# Patient Record
Sex: FEMALE | Race: WHITE | NOT HISPANIC OR LATINO | Employment: OTHER | URBAN - METROPOLITAN AREA
[De-identification: names, ages, dates, MRNs, and addresses within clinical notes are randomized per-mention and may not be internally consistent; named-entity substitution may affect disease eponyms.]

---

## 2017-03-15 ENCOUNTER — OFFICE VISIT (OUTPATIENT)
Dept: OBSTETRICS AND GYNECOLOGY | Facility: CLINIC | Age: 57
End: 2017-03-15

## 2017-03-15 VITALS
DIASTOLIC BLOOD PRESSURE: 80 MMHG | BODY MASS INDEX: 33.13 KG/M2 | WEIGHT: 180 LBS | HEIGHT: 62 IN | SYSTOLIC BLOOD PRESSURE: 124 MMHG

## 2017-03-15 DIAGNOSIS — Z12.4 PAP SMEAR FOR CERVICAL CANCER SCREENING: ICD-10-CM

## 2017-03-15 DIAGNOSIS — F17.200 CURRENT SMOKER: ICD-10-CM

## 2017-03-15 DIAGNOSIS — Z13.9 SCREENING: Primary | ICD-10-CM

## 2017-03-15 DIAGNOSIS — F17.200 SMOKER UNMOTIVATED TO QUIT: ICD-10-CM

## 2017-03-15 DIAGNOSIS — Z12.31 SCREENING MAMMOGRAM, ENCOUNTER FOR: ICD-10-CM

## 2017-03-15 DIAGNOSIS — E66.9 OBESITY (BMI 30-39.9): ICD-10-CM

## 2017-03-15 DIAGNOSIS — F11.20 METHADONE DEPENDENCE (HCC): ICD-10-CM

## 2017-03-15 DIAGNOSIS — Z01.419 WELL FEMALE EXAM WITH ROUTINE GYNECOLOGICAL EXAM: ICD-10-CM

## 2017-03-15 LAB
BILIRUB BLD-MCNC: ABNORMAL MG/DL
CLARITY, POC: CLEAR
COLOR UR: YELLOW
GLUCOSE UR STRIP-MCNC: NEGATIVE MG/DL
KETONES UR QL: ABNORMAL
LEUKOCYTE EST, POC: NEGATIVE
NITRITE UR-MCNC: NEGATIVE MG/ML
PH UR: 6 [PH] (ref 5–8)
PROT UR STRIP-MCNC: ABNORMAL MG/DL
RBC # UR STRIP: NEGATIVE /UL
SP GR UR: 1.02 (ref 1–1.03)
UROBILINOGEN UR QL: NORMAL

## 2017-03-15 PROCEDURE — 81002 URINALYSIS NONAUTO W/O SCOPE: CPT | Performed by: OBSTETRICS & GYNECOLOGY

## 2017-03-15 PROCEDURE — G0101 CA SCREEN;PELVIC/BREAST EXAM: HCPCS | Performed by: OBSTETRICS & GYNECOLOGY

## 2017-03-15 RX ORDER — OXYCODONE HYDROCHLORIDE AND ACETAMINOPHEN 5; 325 MG/1; MG/1
TABLET ORAL
Refills: 0 | COMMUNITY
Start: 2017-03-07 | End: 2022-04-18

## 2017-03-15 RX ORDER — ESTRADIOL 1 MG/1
1 TABLET ORAL DAILY
Qty: 30 TABLET | Refills: 12 | Status: SHIPPED | OUTPATIENT
Start: 2017-03-15 | End: 2017-06-28 | Stop reason: SDUPTHER

## 2017-03-15 RX ORDER — VARENICLINE TARTRATE 1 MG/1
TABLET, FILM COATED ORAL
Refills: 2 | COMMUNITY
Start: 2017-01-11 | End: 2022-04-18

## 2017-03-15 RX ORDER — METHADONE HYDROCHLORIDE 10 MG/1
10 TABLET ORAL 3 TIMES DAILY
Refills: 0 | COMMUNITY
Start: 2017-03-07

## 2017-03-15 RX ORDER — DICLOFENAC SODIUM 75 MG/1
75 TABLET, DELAYED RELEASE ORAL
Refills: 2 | COMMUNITY
Start: 2017-02-10 | End: 2022-04-18

## 2017-03-15 RX ORDER — CYCLOBENZAPRINE HCL 10 MG
10 TABLET ORAL 3 TIMES DAILY PRN
COMMUNITY
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
## 2017-03-17 LAB
CYTOLOGIST CVX/VAG CYTO: NORMAL
CYTOLOGY CVX/VAG DOC THIN PREP: NORMAL
DX ICD CODE: NORMAL
HIV 1 & 2 AB SER-IMP: NORMAL
HPV I/H RISK 4 DNA CVX QL PROBE+SIG AMP: NEGATIVE
OTHER STN SPEC: NORMAL
PATH REPORT.FINAL DX SPEC: NORMAL
STAT OF ADQ CVX/VAG CYTO-IMP: NORMAL

## 2017-03-20 ENCOUNTER — TELEPHONE (OUTPATIENT)
Dept: OBSTETRICS AND GYNECOLOGY | Facility: CLINIC | Age: 57
End: 2017-03-20

## 2017-03-20 NOTE — TELEPHONE ENCOUNTER
----- Message from Harry Otero MD sent at 3/17/2017  5:34 PM EDT -----  Call pt:  PAP is negative.

## 2017-06-28 RX ORDER — ESTRADIOL 1 MG/1
1 TABLET ORAL DAILY
Qty: 30 TABLET | Refills: 9 | Status: SHIPPED | OUTPATIENT
Start: 2017-06-28 | End: 2022-04-18

## 2017-08-31 DIAGNOSIS — Z12.31 SCREENING MAMMOGRAM, ENCOUNTER FOR: ICD-10-CM

## 2017-09-05 ENCOUNTER — TELEPHONE (OUTPATIENT)
Dept: OBSTETRICS AND GYNECOLOGY | Facility: CLINIC | Age: 57
End: 2017-09-05

## 2017-09-05 NOTE — TELEPHONE ENCOUNTER
----- Message from Harry Otero MD sent at 9/1/2017  7:57 AM EDT -----      ----- Message -----     From: Marie Robison Rep     Sent: 8/31/2017   9:13 AM       To: Harry Otero MD

## 2017-09-06 ENCOUNTER — TELEPHONE (OUTPATIENT)
Dept: OBSTETRICS AND GYNECOLOGY | Facility: CLINIC | Age: 57
End: 2017-09-06

## 2017-09-06 NOTE — TELEPHONE ENCOUNTER
----- Message from Harry Otero MD sent at 9/1/2017  7:57 AM EDT -----      ----- Message -----     From: Marie Robison     Sent: 8/31/2017   9:13 AM       To: Harry Otero MD      Call patient: Mammogram is within normal limits

## 2018-11-30 RX ORDER — ESTRADIOL 1 MG/1
1 TABLET ORAL DAILY
Qty: 30 TABLET | Refills: 1 | OUTPATIENT
Start: 2018-11-30

## 2022-04-18 ENCOUNTER — HOSPITAL ENCOUNTER (OUTPATIENT)
Dept: GENERAL RADIOLOGY | Facility: HOSPITAL | Age: 62
Discharge: HOME | End: 2022-04-18

## 2022-04-18 ENCOUNTER — PRE-ADMISSION TESTING (OUTPATIENT)
Dept: PREADMISSION TESTING | Facility: HOSPITAL | Age: 62
End: 2022-04-18

## 2022-04-18 VITALS
HEIGHT: 62 IN | DIASTOLIC BLOOD PRESSURE: 74 MMHG | HEART RATE: 88 BPM | SYSTOLIC BLOOD PRESSURE: 160 MMHG | TEMPERATURE: 98 F | OXYGEN SATURATION: 94 % | WEIGHT: 230 LBS | BODY MASS INDEX: 42.33 KG/M2 | RESPIRATION RATE: 18 BRPM

## 2022-04-18 LAB
ALBUMIN SERPL-MCNC: 4.4 G/DL (ref 3.5–5.2)
ALBUMIN/GLOB SERPL: 1.9 G/DL
ALP SERPL-CCNC: 99 U/L (ref 39–117)
ALT SERPL W P-5'-P-CCNC: 27 U/L (ref 1–33)
ANION GAP SERPL CALCULATED.3IONS-SCNC: 11 MMOL/L (ref 5–15)
APTT PPP: 38.6 SECONDS (ref 22.7–35.4)
AST SERPL-CCNC: 29 U/L (ref 1–32)
BACTERIA UR QL AUTO: ABNORMAL /HPF
BASOPHILS # BLD AUTO: 0.05 10*3/MM3 (ref 0–0.2)
BASOPHILS NFR BLD AUTO: 0.7 % (ref 0–1.5)
BILIRUB SERPL-MCNC: 0.4 MG/DL (ref 0–1.2)
BILIRUB UR QL STRIP: NEGATIVE
BUN SERPL-MCNC: 14 MG/DL (ref 8–23)
BUN/CREAT SERPL: 13.6 (ref 7–25)
CALCIUM SPEC-SCNC: 9 MG/DL (ref 8.6–10.5)
CHLORIDE SERPL-SCNC: 105 MMOL/L (ref 98–107)
CLARITY UR: CLEAR
CO2 SERPL-SCNC: 25 MMOL/L (ref 22–29)
COLOR UR: ABNORMAL
CREAT SERPL-MCNC: 1.03 MG/DL (ref 0.57–1)
DEPRECATED RDW RBC AUTO: 40.8 FL (ref 37–54)
EGFRCR SERPLBLD CKD-EPI 2021: 62 ML/MIN/1.73
EOSINOPHIL # BLD AUTO: 0.3 10*3/MM3 (ref 0–0.4)
EOSINOPHIL NFR BLD AUTO: 4.1 % (ref 0.3–6.2)
ERYTHROCYTE [DISTWIDTH] IN BLOOD BY AUTOMATED COUNT: 12.1 % (ref 12.3–15.4)
GLOBULIN UR ELPH-MCNC: 2.3 GM/DL
GLUCOSE SERPL-MCNC: 91 MG/DL (ref 65–99)
GLUCOSE UR STRIP-MCNC: NEGATIVE MG/DL
HBA1C MFR BLD: 5.5 % (ref 4.8–5.6)
HCT VFR BLD AUTO: 39.8 % (ref 34–46.6)
HGB BLD-MCNC: 12.8 G/DL (ref 12–15.9)
HGB UR QL STRIP.AUTO: ABNORMAL
HYALINE CASTS UR QL AUTO: ABNORMAL /LPF
IMM GRANULOCYTES # BLD AUTO: 0.04 10*3/MM3 (ref 0–0.05)
IMM GRANULOCYTES NFR BLD AUTO: 0.6 % (ref 0–0.5)
INR PPP: 1.4 (ref 0.9–1.1)
KETONES UR QL STRIP: ABNORMAL
LEUKOCYTE ESTERASE UR QL STRIP.AUTO: ABNORMAL
LYMPHOCYTES # BLD AUTO: 0.95 10*3/MM3 (ref 0.7–3.1)
LYMPHOCYTES NFR BLD AUTO: 13.1 % (ref 19.6–45.3)
MCH RBC QN AUTO: 29.6 PG (ref 26.6–33)
MCHC RBC AUTO-ENTMCNC: 32.2 G/DL (ref 31.5–35.7)
MCV RBC AUTO: 92.1 FL (ref 79–97)
MONOCYTES # BLD AUTO: 0.45 10*3/MM3 (ref 0.1–0.9)
MONOCYTES NFR BLD AUTO: 6.2 % (ref 5–12)
NEUTROPHILS NFR BLD AUTO: 5.47 10*3/MM3 (ref 1.7–7)
NEUTROPHILS NFR BLD AUTO: 75.3 % (ref 42.7–76)
NITRITE UR QL STRIP: NEGATIVE
NRBC BLD AUTO-RTO: 0 /100 WBC (ref 0–0.2)
PH UR STRIP.AUTO: <=5 [PH] (ref 5–8)
PLATELET # BLD AUTO: 215 10*3/MM3 (ref 140–450)
PMV BLD AUTO: 10.2 FL (ref 6–12)
POTASSIUM SERPL-SCNC: 4.3 MMOL/L (ref 3.5–5.2)
PROT SERPL-MCNC: 6.7 G/DL (ref 6–8.5)
PROT UR QL STRIP: NEGATIVE
PROTHROMBIN TIME: 17.1 SECONDS (ref 11.7–14.2)
QT INTERVAL: 386 MS
RBC # BLD AUTO: 4.32 10*6/MM3 (ref 3.77–5.28)
RBC # UR STRIP: ABNORMAL /HPF
REF LAB TEST METHOD: ABNORMAL
SODIUM SERPL-SCNC: 141 MMOL/L (ref 136–145)
SP GR UR STRIP: 1.02 (ref 1–1.03)
SQUAMOUS #/AREA URNS HPF: ABNORMAL /HPF
UROBILINOGEN UR QL STRIP: ABNORMAL
WBC # UR STRIP: ABNORMAL /HPF
WBC NRBC COR # BLD: 7.26 10*3/MM3 (ref 3.4–10.8)

## 2022-04-18 PROCEDURE — 81001 URINALYSIS AUTO W/SCOPE: CPT

## 2022-04-18 PROCEDURE — 36415 COLL VENOUS BLD VENIPUNCTURE: CPT

## 2022-04-18 PROCEDURE — 93005 ELECTROCARDIOGRAM TRACING: CPT

## 2022-04-18 PROCEDURE — 80053 COMPREHEN METABOLIC PANEL: CPT

## 2022-04-18 PROCEDURE — 83036 HEMOGLOBIN GLYCOSYLATED A1C: CPT

## 2022-04-18 PROCEDURE — 71046 X-RAY EXAM CHEST 2 VIEWS: CPT

## 2022-04-18 PROCEDURE — 85610 PROTHROMBIN TIME: CPT

## 2022-04-18 PROCEDURE — 85025 COMPLETE CBC W/AUTO DIFF WBC: CPT

## 2022-04-18 PROCEDURE — 93010 ELECTROCARDIOGRAM REPORT: CPT | Performed by: INTERNAL MEDICINE

## 2022-04-18 PROCEDURE — 85730 THROMBOPLASTIN TIME PARTIAL: CPT

## 2022-04-18 PROCEDURE — 73560 X-RAY EXAM OF KNEE 1 OR 2: CPT

## 2022-04-18 ASSESSMENT — KOOS JR
KOOS JR SCORE: 18
KOOS JR SCORE: 42.281

## 2022-04-18 NOTE — DISCHARGE INSTRUCTIONS
Take the following medications the morning of surgery:    LYRICIA AND SYMBICORT    TIME OF ARRIVAL 10:00    If you are on prescription narcotic pain medication to control your pain you may also take that medication the morning of surgery.    General Instructions:  Do not eat solid food after midnight the night before surgery.  You may drink clear liquids day of surgery but must stop at least one hour before your hospital arrival time.  It is beneficial for you to have a clear drink that contains carbohydrates the day of surgery.  We suggest a 12 to 20 ounce bottle of Gatorade or Powerade for non-diabetic patients or a 12 to 20 ounce bottle of G2 or Powerade Zero for diabetic patients. (Pediatric patients, are not advised to drink a 12 to 20 ounce carbohydrate drink)    Clear liquids are liquids you can see through.  Nothing red in color.     Plain water                               Sports drinks  Sodas                                   Gelatin (Jell-O)  Fruit juices without pulp such as white grape juice and apple juice  Popsicles that contain no fruit or yogurt  Tea or coffee (no cream or milk added)  Gatorade / Powerade  G2 / Powerade Zero        Patients who avoid smoking, chewing tobacco and alcohol for 4 weeks prior to surgery have a reduced risk of post-operative complications.  Quit smoking as many days before surgery as you can.  Do not smoke, use chewing tobacco or drink alcohol the day of surgery.   If applicable bring your C-PAP/ BI-PAP machine.  Bring any papers given to you in the doctor’s office.  Wear clean comfortable clothes.  Do not wear contact lenses, false eyelashes or make-up.  Bring a case for your glasses.   Bring crutches or walker if applicable.  Remove all piercings.  Leave jewelry and any other valuables at home.  Hair extensions with metal clips must be removed prior to surgery.  The Pre-Admission Testing nurse will instruct you to bring medications if unable to obtain an accurate list  in Pre-Admission Testing.            Preventing a Surgical Site Infection:  For 2 to 3 days before surgery, avoid shaving with a razor because the razor can irritate skin and make it easier to develop an infection.    Any areas of open skin can increase the risk of a post-operative wound infection by allowing bacteria to enter and travel throughout the body.  Notify your surgeon if you have any skin wounds / rashes even if it is not near the expected surgical site.  The area will need assessed to determine if surgery should be delayed until it is healed.  The night prior to surgery shower using a fresh bar of anti-bacterial soap (such as Dial) and clean washcloth.  Sleep in a clean bed with clean clothing.  Do not allow pets to sleep with you.  Shower on the morning of surgery using a fresh bar of anti-bacterial soap (such as Dial) and clean washcloth.  Dry with a clean towel and dress in clean clothing.  Ask your surgeon if you will be receiving antibiotics prior to surgery.  Make sure you, your family, and all healthcare providers clean their hands with soap and water or an alcohol based hand  before caring for you or your wound.    Day of surgery:  Your arrival time is approximately two hours before your scheduled surgery time.  Upon arrival, a Pre-op nurse and Anesthesiologist will review your health history, obtain vital signs, and answer questions you may have.  The only belongings needed at this time will be a list of your home medications and if applicable your C-PAP/BI-PAP machine.  A Pre-op nurse will start an IV and you may receive medication in preparation for surgery, including something to help you relax.     Please be aware that surgery does come with discomfort.  We want to make every effort to control your discomfort so please discuss any uncontrolled symptoms with your nurse.   Your doctor will most likely have prescribed pain medications.      If you are going home after surgery you will  receive individualized written care instructions before being discharged.  A responsible adult must drive you to and from the hospital on the day of your surgery and stay with you for 24 hours.  Discharge prescriptions can be filled by the hospital pharmacy during regular pharmacy hours.  If you are having surgery late in the day/evening your prescription may be e-prescribed to your pharmacy.  Please verify your pharmacy hours or chose a 24 hour pharmacy to avoid not having access to your prescription because your pharmacy has closed for the day.    If you are staying overnight following surgery, you will be transported to your hospital room following the recovery period.  [facility] has all private rooms.    If you have any questions please call Pre-Admission Testing at [phone].  Deductibles and co-payments are collected on the day of service. Please be prepared to pay the required co-pay, deductible or deposit on the day of service as defined by your plan.    Patient Education for Self-Quarantine Process    Following your COVID testing, we strongly recommend that you wear a mask when you are with other people and practice social distancing.   Limit your activities to only required outings.  Wash your hands with soap and water frequently for at least 20 seconds.   Avoid touching your eyes, nose and mouth with unwashed hands.  Do not share anything - utensils, drinking glasses, food from the same bowl.   Sanitize household surfaces daily. Include all high touch areas (door handles, light switches, phones, countertops, etc.)    Call your surgeon immediately if you experience any of the following symptoms:  Sore Throat  Shortness of Breath or difficulty breathing  Cough  Chills  Body soreness or muscle pain  Headache  Fever  New loss of taste or smell  Do not arrive for your surgery ill.  Your procedure will need to be rescheduled to another time.  You will need to call your physician before the  day of surgery to avoid any unnecessary exposure to hospital staff as well as other patients.       CHLORHEXIDINE CLOTH INSTRUCTIONS  The morning of surgery follow these instructions using the Chlorhexidine cloths you've been given.  These steps reduce bacteria on the body.  Do not use the cloths near your eyes, ears mouth, genitalia or on open wounds.  Throw the cloths away after use but do not try to flush them down a toilet.      Open and remove one cloth at a time from the package.    Leave the cloth unfolded and begin the bathing.  Massage the skin with the cloths using gentle pressure to remove bacteria.  Do not scrub harshly.   Follow the steps below with one 2% CHG cloth per area (6 total cloths).  One cloth for neck, shoulders and chest.  One cloth for both arms, hands, fingers and underarms (do underarms last).  One cloth for the abdomen followed by groin.  One cloth for right leg and foot including between the toes.  One cloth for left leg and foot including between the toes.  The last cloth is to be used for the back of the neck, back and buttocks.    Allow the CHG to air dry 3 minutes on the skin which will give it time to work and decrease the chance of irritation.  The skin may feel sticky until it is dry.  Do not rinse with water or any other liquid or you will lose the beneficial effects of the CHG.  If mild skin irritation occurs, do rinse the skin to remove the CHG.  Report this to the nurse at time of admission.  Do not apply lotions, creams, ointments, deodorants or perfumes after using the clothes. Dress in clean clothes before coming to the hospital.    BACTROBAN NASAL OINTMENT  There are many germs normally in your nose. Bactroban is an ointment that will help reduce these germs. Please follow these instructions for Bactroban use:      ____The day before surgery in the morning  Date_   MAY 1_______    ____The day before surgery in the evening              Date__MAY1_____    ____The day of  surgery in the morning    Date__MAY 2____    **Squirt ½ package of Bactroban Ointment onto a cotton applicator and apply to inside of 1st nostril.  Squirt the remaining Bactroban and apply to the inside of the other nostril.

## 2022-04-19 ENCOUNTER — APPOINTMENT (OUTPATIENT)
Dept: PREADMISSION TESTING | Facility: HOSPITAL | Age: 62
End: 2022-04-19

## 2022-04-29 ENCOUNTER — LAB (OUTPATIENT)
Dept: LAB | Facility: HOSPITAL | Age: 62
End: 2022-04-29

## 2022-04-29 LAB — SARS-COV-2 ORF1AB RESP QL NAA+PROBE: NOT DETECTED

## 2022-04-29 PROCEDURE — U0004 COV-19 TEST NON-CDC HGH THRU: HCPCS

## 2022-04-29 PROCEDURE — C9803 HOPD COVID-19 SPEC COLLECT: HCPCS

## 2024-09-27 ENCOUNTER — PATIENT ROUNDING (BHMG ONLY) (OUTPATIENT)
Dept: FAMILY MEDICINE CLINIC | Facility: CLINIC | Age: 64
End: 2024-09-27
Payer: MEDICARE

## 2024-09-27 ENCOUNTER — OFFICE VISIT (OUTPATIENT)
Dept: FAMILY MEDICINE CLINIC | Facility: CLINIC | Age: 64
End: 2024-09-27
Payer: MEDICARE

## 2024-09-27 VITALS
RESPIRATION RATE: 16 BRPM | HEART RATE: 86 BPM | OXYGEN SATURATION: 92 % | TEMPERATURE: 97.8 F | HEIGHT: 62 IN | DIASTOLIC BLOOD PRESSURE: 64 MMHG | SYSTOLIC BLOOD PRESSURE: 122 MMHG | BODY MASS INDEX: 34.3 KG/M2 | WEIGHT: 186.4 LBS

## 2024-09-27 DIAGNOSIS — Z11.59 NEED FOR HEPATITIS C SCREENING TEST: ICD-10-CM

## 2024-09-27 DIAGNOSIS — I63.9 CEREBROVASCULAR ACCIDENT (CVA), UNSPECIFIED MECHANISM: Primary | ICD-10-CM

## 2024-09-27 DIAGNOSIS — Z86.718 HISTORY OF DVT (DEEP VEIN THROMBOSIS): ICD-10-CM

## 2024-09-27 DIAGNOSIS — J44.9 CHRONIC OBSTRUCTIVE PULMONARY DISEASE, UNSPECIFIED COPD TYPE: ICD-10-CM

## 2024-09-27 PROCEDURE — 99204 OFFICE O/P NEW MOD 45 MIN: CPT | Performed by: FAMILY MEDICINE

## 2024-09-27 RX ORDER — ATORVASTATIN CALCIUM 40 MG/1
1 TABLET, FILM COATED ORAL DAILY
COMMUNITY
Start: 2024-07-11

## 2024-10-17 LAB
ALBUMIN SERPL-MCNC: 4.1 G/DL (ref 3.5–5.2)
ALBUMIN/GLOB SERPL: 2 G/DL
ALP SERPL-CCNC: 128 U/L (ref 39–117)
ALT SERPL-CCNC: 11 U/L (ref 1–33)
AST SERPL-CCNC: 17 U/L (ref 1–32)
BASOPHILS # BLD AUTO: 0.06 10*3/MM3 (ref 0–0.2)
BASOPHILS NFR BLD AUTO: 0.9 % (ref 0–1.5)
BILIRUB SERPL-MCNC: 0.8 MG/DL (ref 0–1.2)
BUN SERPL-MCNC: 11 MG/DL (ref 8–23)
BUN/CREAT SERPL: 11.8 (ref 7–25)
CALCIUM SERPL-MCNC: 9 MG/DL (ref 8.6–10.5)
CHLORIDE SERPL-SCNC: 102 MMOL/L (ref 98–107)
CHOLEST SERPL-MCNC: 144 MG/DL (ref 0–200)
CO2 SERPL-SCNC: 30.7 MMOL/L (ref 22–29)
CREAT SERPL-MCNC: 0.93 MG/DL (ref 0.57–1)
EGFRCR SERPLBLD CKD-EPI 2021: 69.2 ML/MIN/1.73
EOSINOPHIL # BLD AUTO: 0.24 10*3/MM3 (ref 0–0.4)
EOSINOPHIL NFR BLD AUTO: 3.5 % (ref 0.3–6.2)
ERYTHROCYTE [DISTWIDTH] IN BLOOD BY AUTOMATED COUNT: 12.1 % (ref 12.3–15.4)
GLOBULIN SER CALC-MCNC: 2.1 GM/DL
GLUCOSE SERPL-MCNC: 69 MG/DL (ref 65–99)
HCT VFR BLD AUTO: 40.7 % (ref 34–46.6)
HCV IGG SERPL QL IA: NON REACTIVE
HDLC SERPL-MCNC: 82 MG/DL (ref 40–60)
HGB BLD-MCNC: 12.8 G/DL (ref 12–15.9)
IMM GRANULOCYTES # BLD AUTO: 0.02 10*3/MM3 (ref 0–0.05)
IMM GRANULOCYTES NFR BLD AUTO: 0.3 % (ref 0–0.5)
LDLC SERPL CALC-MCNC: 51 MG/DL (ref 0–100)
LYMPHOCYTES # BLD AUTO: 1.19 10*3/MM3 (ref 0.7–3.1)
LYMPHOCYTES NFR BLD AUTO: 17.5 % (ref 19.6–45.3)
MCH RBC QN AUTO: 28.8 PG (ref 26.6–33)
MCHC RBC AUTO-ENTMCNC: 31.4 G/DL (ref 31.5–35.7)
MCV RBC AUTO: 91.5 FL (ref 79–97)
MONOCYTES # BLD AUTO: 0.5 10*3/MM3 (ref 0.1–0.9)
MONOCYTES NFR BLD AUTO: 7.4 % (ref 5–12)
NEUTROPHILS # BLD AUTO: 4.79 10*3/MM3 (ref 1.7–7)
NEUTROPHILS NFR BLD AUTO: 70.4 % (ref 42.7–76)
NRBC BLD AUTO-RTO: 0 /100 WBC (ref 0–0.2)
PLATELET # BLD AUTO: 277 10*3/MM3 (ref 140–450)
POTASSIUM SERPL-SCNC: 4.3 MMOL/L (ref 3.5–5.2)
PROT SERPL-MCNC: 6.2 G/DL (ref 6–8.5)
RBC # BLD AUTO: 4.45 10*6/MM3 (ref 3.77–5.28)
SODIUM SERPL-SCNC: 142 MMOL/L (ref 136–145)
TRIGL SERPL-MCNC: 51 MG/DL (ref 0–150)
VLDLC SERPL CALC-MCNC: 11 MG/DL (ref 5–40)
WBC # BLD AUTO: 6.8 10*3/MM3 (ref 3.4–10.8)

## 2024-10-21 ENCOUNTER — OFFICE VISIT (OUTPATIENT)
Dept: FAMILY MEDICINE CLINIC | Facility: CLINIC | Age: 64
End: 2024-10-21
Payer: MEDICARE

## 2024-10-21 VITALS
WEIGHT: 190 LBS | SYSTOLIC BLOOD PRESSURE: 108 MMHG | BODY MASS INDEX: 34.75 KG/M2 | TEMPERATURE: 96.8 F | OXYGEN SATURATION: 94 % | DIASTOLIC BLOOD PRESSURE: 58 MMHG | HEART RATE: 78 BPM

## 2024-10-21 DIAGNOSIS — Z00.00 ENCOUNTER FOR SUBSEQUENT ANNUAL WELLNESS VISIT (AWV) IN MEDICARE PATIENT: Primary | ICD-10-CM

## 2024-10-21 DIAGNOSIS — E78.5 HYPERLIPIDEMIA, UNSPECIFIED HYPERLIPIDEMIA TYPE: ICD-10-CM

## 2024-10-21 DIAGNOSIS — Z74.8 ASSISTANCE NEEDED WITH TRANSPORTATION: ICD-10-CM

## 2024-10-21 DIAGNOSIS — Z86.718 HISTORY OF DVT (DEEP VEIN THROMBOSIS): ICD-10-CM

## 2024-10-21 DIAGNOSIS — Z12.11 COLON CANCER SCREENING: ICD-10-CM

## 2024-10-21 DIAGNOSIS — Z78.0 POSTMENOPAUSAL: ICD-10-CM

## 2024-10-21 DIAGNOSIS — Z23 IMMUNIZATION DUE: ICD-10-CM

## 2024-10-21 DIAGNOSIS — G89.29 OTHER CHRONIC PAIN: ICD-10-CM

## 2024-10-21 PROBLEM — E66.811 OBESITY (BMI 30.0-34.9): Status: ACTIVE | Noted: 2024-10-21

## 2024-10-21 PROCEDURE — 90656 IIV3 VACC NO PRSV 0.5 ML IM: CPT | Performed by: FAMILY MEDICINE

## 2024-10-21 PROCEDURE — 1170F FXNL STATUS ASSESSED: CPT | Performed by: FAMILY MEDICINE

## 2024-10-21 PROCEDURE — G0008 ADMIN INFLUENZA VIRUS VAC: HCPCS | Performed by: FAMILY MEDICINE

## 2024-10-21 PROCEDURE — 90677 PCV20 VACCINE IM: CPT | Performed by: FAMILY MEDICINE

## 2024-10-21 PROCEDURE — G0009 ADMIN PNEUMOCOCCAL VACCINE: HCPCS | Performed by: FAMILY MEDICINE

## 2024-10-21 PROCEDURE — 96160 PT-FOCUSED HLTH RISK ASSMT: CPT | Performed by: FAMILY MEDICINE

## 2024-10-21 PROCEDURE — G0439 PPPS, SUBSEQ VISIT: HCPCS | Performed by: FAMILY MEDICINE

## 2024-10-21 PROCEDURE — 1126F AMNT PAIN NOTED NONE PRSNT: CPT | Performed by: FAMILY MEDICINE

## 2024-10-21 NOTE — PROGRESS NOTES
Subjective   The ABCs of the Annual Wellness Visit  Medicare Wellness Visit      Mague Escamilla is a 64 y.o. patient who presents for a Medicare Wellness Visit.    The following portions of the patient's history were reviewed and   updated as appropriate: allergies, current medications, past family history, past medical history, past social history, past surgical history, and problem list.    Compared to one year ago, the patient's physical   health is the same.  Compared to one year ago, the patient's mental   health is the same.    Recent Hospitalizations:  She was not admitted to the hospital during the last year.     Current Medical Providers:  Patient Care Team:  Cecille Calero MD as PCP - General (Emergency Medicine)  Antione Suarez MD as Emergency Attending (Family Medicine)    Outpatient Medications Prior to Visit   Medication Sig Dispense Refill    atorvastatin (LIPITOR) 40 MG tablet Take 1 tablet by mouth Daily.      Budesonide-Formoterol Fumarate (SYMBICORT IN) Inhale 1 puff 2 (Two) Times a Day.      cyclobenzaprine (FLEXERIL) 10 MG tablet Take 1 tablet by mouth 3 (Three) Times a Day As Needed for Muscle Spasms.      Diclofenac Sodium (VOLTAREN) 1 % gel gel Apply 4 g topically to the appropriate area as directed 4 (Four) Times a Day As Needed.      LYRICA 200 MG capsule Take 1 capsule by mouth 2 (Two) Times a Day.  2    methadone (DOLOPHINE) 10 MG tablet Take 1 tablet by mouth 3 (Three) Times a Day INSTRUCTED TO CALL PAIN MANAGEMENT AND DR YIP REGARDING WHEN TO HOLD FOR SURGERY,  0    mupirocin (BACTROBAN) 2 % nasal ointment Administer  into the nostril(s) as directed by provider. As directed pre op      rivaroxaban (XARELTO) 20 MG tablet Take 1 tablet by mouth Every Evening. PT IS TO SEE MANUEL GAGE ON  04/20 AND IS ASKING WHEN TO HOLD FOR SURGERY       No facility-administered medications prior to visit.     Opioid medication/s are on active medication list.  and I have evaluated her active  "treatment plan and pain score trends (see table).  Vitals:    10/21/24 1353   PainSc: 0-No pain     I have reviewed the chart for potential of high risk medication and harmful drug interactions in the elderly.        Aspirin is not on active medication list.  Aspirin use is not indicated based on review of current medical condition/s. Risk of harm outweighs potential benefits.  .    Patient Active Problem List   Diagnosis    Well female exam with routine gynecological exam    Obesity (BMI 30-39.9)    Methadone dependence    Current smoker    Obesity (BMI 30.0-34.9)     Advance Care Planning Advance Directive is not on file.  ACP discussion was held with the patient during this visit. Patient does not have an advance directive, information provided.            Objective   Vitals:    10/21/24 1353   BP: 108/58   BP Location: Left arm   Patient Position: Sitting   Pulse: 78   Temp: 96.8 °F (36 °C)   SpO2: 94%   Weight: 86.2 kg (190 lb)   PainSc: 0-No pain       Estimated body mass index is 34.75 kg/m² as calculated from the following:    Height as of 24: 157.5 cm (62\").    Weight as of this encounter: 86.2 kg (190 lb).    BMI is >= 30 and <35. (Class 1 Obesity). The following options were offered after discussion;: exercise counseling/recommendations and nutrition counseling/recommendations       Does the patient have evidence of cognitive impairment? No  Lab Results   Component Value Date    CHLPL 144 10/16/2024    TRIG 51 10/16/2024    HDL 82 (H) 10/16/2024    LDL 51 10/16/2024    VLDL 11 10/16/2024                                                                                               Health  Risk Assessment    Smoking Status:  Social History     Tobacco Use   Smoking Status Former    Current packs/day: 0.00    Average packs/day: 2.0 packs/day for 40.0 years (80.0 ttl pk-yrs)    Types: Cigarettes    Start date:     Quit date: 2017    Years since quittin.8   Smokeless Tobacco Never     Alcohol " Consumption:  Social History     Substance and Sexual Activity   Alcohol Use No       Fall Risk Screen  STEADI Fall Risk Assessment was completed, and patient is at MODERATE risk for falls. Assessment completed on:10/21/2024    Depression Screening:      10/21/2024     2:25 PM   PHQ-2/PHQ-9 Depression Screening   Little interest or pleasure in doing things Not at all   Feeling down, depressed, or hopeless Not at all   Trouble falling or staying asleep, or sleeping too much Not at all   Feeling tired or having little energy Not at all   Poor appetite or overeating Not at all   Feeling bad about yourself - or that you are a failure or have let yourself or your family down Not at all   Trouble concentrating on things, such as reading the newspaper or watching television Not at all   Moving or speaking so slowly that other people could have noticed? Or the opposite - being so fidgety or restless that you have been moving around a lot more than usual. Not at all   Thoughts that you would be better off dead or hurting yourself in some way Not at all   Patient Health Questionnaire-9 Score 0   How difficult have these problems made it for you to do your work, take care of things at home, or get along with other people? Not difficult at all     Health Habits and Functional and Cognitive Screening:      10/21/2024     2:01 PM   Functional & Cognitive Status   Do you have difficulty preparing food and eating? No   Do you have difficulty bathing yourself, getting dressed or grooming yourself? No   Do you have difficulty using the toilet? No   Do you have difficulty moving around from place to place? No   Do you have trouble with steps or getting out of a bed or a chair? No   Current Diet Well Balanced Diet   Dental Exam Up to date   Eye Exam Not up to date   Exercise (times per week) 0 times per week   Current Exercises Include No Regular Exercise   Do you need help using the phone?  No   Are you deaf or do you have serious  difficulty hearing?  No   Do you need help to go to places out of walking distance? No   Do you need help shopping? No   Do you need help preparing meals?  No   Do you need help with housework?  No   Do you need help with laundry? No   Do you need help taking your medications? No   Do you need help managing money? No   Do you ever drive or ride in a car without wearing a seat belt? No   Have you felt unusual stress, anger or loneliness in the last month? No   Who do you live with? Alone   If you need help, do you have trouble finding someone available to you? No   Have you been bothered in the last four weeks by sexual problems? No   Do you have difficulty concentrating, remembering or making decisions? No           Age-appropriate Screening Schedule:  Refer to the list below for future screening recommendations based on patient's age, sex and/or medical conditions. Orders for these recommended tests are listed in the plan section. The patient has been provided with a written plan.    Health Maintenance List  Health Maintenance   Topic Date Due    COLORECTAL CANCER SCREENING  Never done    ZOSTER VACCINE (1 of 2) Never done    PAP SMEAR  03/15/2020    COVID-19 Vaccine (5 - 2023-24 season) 09/01/2024    LUNG CANCER SCREENING  04/30/2025    MAMMOGRAM  05/23/2025    LIPID PANEL  10/16/2025    ANNUAL WELLNESS VISIT  10/21/2025    BMI FOLLOWUP  10/21/2025    TDAP/TD VACCINES (2 - Td or Tdap) 04/15/2028    HEPATITIS C SCREENING  Completed    Pneumococcal Vaccine 0-64  Completed    INFLUENZA VACCINE  Completed                                                                                                                                                CMS Preventative Services Quick Reference  Risk Factors Identified During Encounter  None Identified    The above risks/problems have been discussed with the patient.  Pertinent information has been shared with the patient in the After Visit Summary.  An After Visit Summary  and PPPS were made available to the patient.    Follow Up:   Next Medicare Wellness visit to be scheduled in 1 year.     Assessment & Plan  Encounter for subsequent annual wellness visit (AWV) in Medicare patient  Referral for colonoscopy  Has positive family history of colon cancer  LDCT up-to-date  Last DEXA scan 4/12/2022, normal bone density, due for recheck  Colon cancer screening    Immunization due  Flu and PCV 20  Assistance needed with transportation    History of DVT (deep vein thrombosis)  Continue Eliquis  Other chronic pain  Following with pain management  Hyperlipidemia, unspecified hyperlipidemia type     Continue Lipitor 40 mg  Postmenopausal  DEXA scan    Orders Placed This Encounter   Procedures    DEXA Bone Density Axial    Fluzone >6mos ([id number])    Pneumococcal Conjugate Vaccine 20-Valent (PCV20)    Ambulatory Referral For Screening Colonoscopy    Ambulatory Referral to Social Care Services (Amb Case Mgmt)             Follow Up:   No follow-ups on file.

## 2024-10-22 ENCOUNTER — REFERRAL TRIAGE (OUTPATIENT)
Age: 64
End: 2024-10-22
Payer: MEDICARE

## 2024-10-24 ENCOUNTER — PATIENT OUTREACH (OUTPATIENT)
Age: 64
End: 2024-10-24
Payer: MEDICARE

## 2024-10-24 NOTE — OUTREACH NOTE
MSW received referral from primary care providers office for assistance with community resources. MSW outreach to patient by phone and left voicemail and call back number. MSW will continue to attempt outreach to patient.    Lani PATTERSON -   Ambulatory Case Management    10/24/2024, 09:47 EDT

## 2024-11-01 ENCOUNTER — OFFICE VISIT (OUTPATIENT)
Dept: FAMILY MEDICINE CLINIC | Facility: CLINIC | Age: 64
End: 2024-11-01
Payer: MEDICARE

## 2024-11-01 VITALS
SYSTOLIC BLOOD PRESSURE: 128 MMHG | DIASTOLIC BLOOD PRESSURE: 88 MMHG | HEART RATE: 112 BPM | OXYGEN SATURATION: 95 % | WEIGHT: 185.4 LBS | BODY MASS INDEX: 34.12 KG/M2 | HEIGHT: 62 IN

## 2024-11-01 DIAGNOSIS — J01.40 ACUTE PANSINUSITIS, RECURRENCE NOT SPECIFIED: Primary | ICD-10-CM

## 2024-11-01 PROCEDURE — 1126F AMNT PAIN NOTED NONE PRSNT: CPT | Performed by: NURSE PRACTITIONER

## 2024-11-01 PROCEDURE — 99213 OFFICE O/P EST LOW 20 MIN: CPT | Performed by: NURSE PRACTITIONER

## 2024-11-01 RX ORDER — PREDNISONE 20 MG/1
TABLET ORAL
Qty: 9 TABLET | Refills: 0 | Status: SHIPPED | OUTPATIENT
Start: 2024-11-01 | End: 2024-11-06

## 2024-11-01 RX ORDER — IPRATROPIUM BROMIDE 21 UG/1
2 SPRAY, METERED NASAL EVERY 12 HOURS
Qty: 30 ML | Refills: 0 | Status: SHIPPED | OUTPATIENT
Start: 2024-11-01

## 2024-11-01 RX ORDER — ALBUTEROL SULFATE 90 UG/1
2 INHALANT RESPIRATORY (INHALATION) EVERY 4 HOURS PRN
Qty: 17 G | Refills: 2 | Status: SHIPPED | OUTPATIENT
Start: 2024-11-01

## 2024-11-01 NOTE — PROGRESS NOTES
"Chief Complaint  Sinus Problem (Asked pt to do flu/covid said she does not want to she knows it is sinuses. Says she has copd so it makes it worse on her. Started a week ago. )    Subjective        Mague Escamilla presents to [facility]  History of Present Illness    Patient is a patient of Dr Calero. She presents today with complaint of feeling bad about 5 days ago.     Reports pressure behind right ear, headaches,  sore throat, pressure behind right eye,   Denies fever that she knows of, shortness of breath, cough, body aches, nausea, vomiting, diarrhea,     OTC: mucinex D    Objective   Vital Signs:  /88   Pulse 112   Ht 157.5 cm (62.01\")   Wt 84.1 kg (185 lb 6.4 oz)   SpO2 95%   BMI 33.90 kg/m²   Estimated body mass index is 33.9 kg/m² as calculated from the following:    Height as of this encounter: 157.5 cm (62.01\").    Weight as of this encounter: 84.1 kg (185 lb 6.4 oz).            Physical Exam  Constitutional:       Appearance: Normal appearance.   HENT:      Head: Normocephalic and atraumatic.      Right Ear: Tympanic membrane has decreased mobility.      Left Ear: Tympanic membrane has decreased mobility.      Nose: Rhinorrhea present.      Right Sinus: Maxillary sinus tenderness and frontal sinus tenderness present.      Left Sinus: Maxillary sinus tenderness and frontal sinus tenderness present.      Mouth/Throat:      Mouth: Mucous membranes are moist.   Cardiovascular:      Rate and Rhythm: Normal rate and regular rhythm.      Pulses: Normal pulses.   Pulmonary:      Effort: Pulmonary effort is normal.      Breath sounds: Normal breath sounds.   Skin:     General: Skin is warm and dry.   Neurological:      Mental Status: She is alert.   Psychiatric:         Mood and Affect: Mood normal.         Behavior: Behavior normal.         Thought Content: Thought content normal.         Judgment: Judgment normal.        Result Review :                Assessment and " Plan   Diagnoses and all orders for this visit:    1. Acute pansinusitis, recurrence not specified (Primary)    Other orders  -     amoxicillin-clavulanate (AUGMENTIN) 875-125 MG per tablet; Take 1 tablet by mouth 2 (Two) Times a Day.  Dispense: 14 tablet; Refill: 0  -     predniSONE (DELTASONE) 20 MG tablet; Take 1 tablet by mouth 2 (Two) Times a Day for 3 days, THEN 1 tablet Daily for 3 days.  Dispense: 9 tablet; Refill: 0  -     ipratropium (ATROVENT) 0.03 % nasal spray; Administer 2 sprays into the nostril(s) as directed by provider Every 12 (Twelve) Hours.  Dispense: 30 mL; Refill: 0  -     albuterol sulfate  (90 Base) MCG/ACT inhaler; Inhale 2 puffs Every 4 (Four) Hours As Needed for Wheezing.  Dispense: 17 g; Refill: 2    For sinusitis.  Start antibiotic.  Lungs are diminished but cannot hear any wheezing.  Will give steroid.  Patient reports she has never had an albuterol so we will give that for COPD.  Use Atrovent as needed.  If no resolution follow-up as needed.         Follow Up   No follow-ups on file.  Patient was given instructions and counseling regarding her condition or for health maintenance advice. Please see specific information pulled into the AVS if appropriate.

## 2024-11-08 ENCOUNTER — PATIENT OUTREACH (OUTPATIENT)
Age: 64
End: 2024-11-08
Payer: MEDICARE

## 2024-11-08 NOTE — OUTREACH NOTE
MSW outreach to patient by phone on 3 attempts with voicemail. MSW unable to reach by phone and has left MSW contact information for patient as needed for follow-up regarding community resource needs. MSW to sign off.    Lani PATTERSON -   Ambulatory Case Management    11/8/2024, 09:36 EST

## 2024-11-15 DIAGNOSIS — Z78.0 POSTMENOPAUSAL: ICD-10-CM

## 2024-11-18 NOTE — PROGRESS NOTES
Date of visit: 2024   Patient ID: Mague Escamilla  Age: 64 y.o.     Chief compliant:   Chief Complaint   Patient presents with    Stroke       HPI:      Mague Escamilla is a 64 y.o. right-handed female with ischemic stroke (2024) with no residual deficit, chronic back pain with a h/o lumbar fusion, SCS s/p removal, DVT on Xarelto, COPD, former tobacco use, and DANIE who presents for evaluation of stroke. She was referred from her primary care provider for further evaluation.    She was diagnosed with an ischemic stroke at [facility] as confirmed on MRI on 2024. She reports she couldn't see things well on her left side. She felt drunk while walking. She tried to put coffee ground in her , but missed. She noticed since leaving the hospital she has had some blurriness of vision just out of her right eye. She reports her walking is normal now. She has a history of DVT in the right leg a couple years ago. She wasn't taking Xarelto with food and had forgotten to take it. She was also put on a cholesterol medication on discharge which she is taking. She had a GABRIELA that was reassuring during her hospitalization. She was discharged on a Zio patch which she recalls briefly completing, but did not hear regarding the results. She reports drooping of her face after getting her teeth pulled.     She denies any history of palpitations, chest pain, exertional shortness of breath.  Denies having a diagnosis of any cardiac problems or arrhythmia.  She reports her daughter  from atrial fibrillation.    Pertinent FHx/Social: former smoker, no significant alcohol use,    Review of Systems   Constitutional:  Negative for activity change, appetite change, fatigue and unexpected weight change.   HENT:  Negative for ear pain, hearing loss, nosebleeds, tinnitus and trouble swallowing.    Eyes:  Negative for photophobia, pain and visual disturbance.   Respiratory:  Negative for choking, chest tightness,  shortness of breath and stridor.    Cardiovascular:  Negative for chest pain and palpitations.   Endocrine: Negative for cold intolerance and heat intolerance.   Musculoskeletal:  Positive for gait problem (1 fall in last year).   Allergic/Immunologic: Positive for food allergies (shell fish).   Neurological:  Negative for dizziness, tremors, syncope, weakness, light-headedness, numbness and headaches.   Psychiatric/Behavioral:  Negative for confusion and decreased concentration.         The following portions of the patient's history were reviewed and updated as appropriate: allergies, current medications, past family history, past medical history, past social history, past surgical history and problem list.    Vitals:    11/21/24 1524   BP: 130/90   Pulse: 70   SpO2: 94%       Neurological Exam  Mental Status  Awake, alert and oriented to person, place and time. Recent and remote memory are intact. Speech is normal. Language is fluent with no aphasia. Attention and concentration are normal. Fund of knowledge is appropriate for level of education.    Cranial Nerves  CN II: Visual acuity is normal. Visual fields full to confrontation.  CN III, IV, VI: Extraocular movements intact bilaterally. Normal lids and orbits bilaterally. Pupils equal round and reactive to light bilaterally.  CN V: Facial sensation is normal.  CN VII: Full and symmetric facial movement.  CN IX, X: Palate elevates symmetrically  CN XI: Shoulder shrug strength is normal.  CN XII: Tongue midline without atrophy or fasciculations.    Motor  Normal muscle bulk throughout. Normal muscle tone.                                               Right                     Left   Shoulder adduction               5                          5  Elbow flexion                         5                          5  Elbow extension                    5                          5  Wrist flexion                           5                          5  Wrist extension     "                  5                          5  Thumb abduction                   5                          5  Hip flexion                              4+                          5  Hip extension                         5                          5  Knee flexion                           5                          5  Knee extension                      4+                          5  Plantarflexion                         5                          5  Dorsiflexion                            4+                          5  Strength: Pain limited RLE.    Sensory  Light touch is normal in upper and lower extremities. Pinprick abnormality: Vibration is normal in upper and lower extremities. Proprioception is normal in upper and lower extremities. Sensation: Complex pinprick deficits. Lateral aspect of left wrist and forearm diminished on left arm. Diminished to lateral aspect right foot and leg with improvement about the knee. .     Reflexes                                            Right                      Left  Brachioradialis                    2+                         2+  Biceps                                 2+                         2+  Triceps                                2+                         2+  Patellar                                2+                         2+  Achilles                                2+                         2+  Right Plantar: downgoing  Left Plantar: downgoing    Right pathological reflexes: Ankle clonus absent.  Left pathological reflexes: Ankle clonus absent.    Coordination    Finger-to-nose, rapid alternating movements and heel-to-shin normal bilaterally without dysmetria.    Gait  Casual gait is normal including stance, stride, and arm swing. Romberg is absent.      GABRIELA Normal LV EF 55-60%, RV normal, Mild-moderate MR, Mild  TR, No PFO, No MARCUS clot    She had an event monitor for 11 days: \" IMPRESSION:   1. Abnormal event monitor with the patient being in sinus rhythm " "with episodes   of paroxysmal atrial fibrillation that are brief and less than 10 seconds in   duration, at heart rates up to 166 per minute.   2. Rare isolated premature ventricular ectopy.   3. No sustained or nonsustained ventricular arrhythmias noted.\"    Imaging: Radiology report reviewed: MRI report reviewed, right parietal and occipital strokes reported.  CTA head and neck wnl  Labs reviewed including LDL 51    Assessment/Plan:    Mague Escamilla is a 64 y.o. female presenting to establish care after an ischemic stroke at [facility] in May 2024.  Imaging unavailable for review, but distribution and symptoms raise concern for embolic appearing process.  Cardiac monitor post discharge is noted for transient episodes of tachycardia consistent with atrial fibrillation.  Patient was on Xarelto for a right lower extremity DVT, but was intermittently compliant due to abdominal issues and was not taking with an evening meal.  Overall, suspect cardioembolic strokes on in setting of insufficient compliance with Xarelto.  She should continue her Xarelto at this time.  Patient was started on Lipitor 40 mg.  This can be dose reduced if she is experiencing side effects as her LDL is at goal and do not suspect an atherosclerotic process.  She appears to be largely resolved from her stroke deficits on examination, and current exam findings are more in line with chronic back pain and suggestive of possible right L4/L5 radiculopathy.  She is established with orthopedic surgery and pain management with prior lumbar fusion.         Diagnoses and all orders for this visit:    1. Paroxysmal atrial fibrillation (Primary)  -     Ambulatory Referral to Cardiology    2. History of ischemic right MCA stroke  -     Ambulatory Referral to Cardiology    3. Lumbar radiculopathy, chronic           Additional recommendations/considerations:  Will follow-up in 6 months to ensure no recurrent symptoms    Amado Deng MD  "

## 2024-11-21 ENCOUNTER — OFFICE VISIT (OUTPATIENT)
Dept: NEUROLOGY | Facility: CLINIC | Age: 64
End: 2024-11-21
Payer: MEDICARE

## 2024-11-21 VITALS
HEART RATE: 70 BPM | BODY MASS INDEX: 35.2 KG/M2 | SYSTOLIC BLOOD PRESSURE: 130 MMHG | HEIGHT: 62 IN | DIASTOLIC BLOOD PRESSURE: 90 MMHG | OXYGEN SATURATION: 94 % | WEIGHT: 191.3 LBS

## 2024-11-21 DIAGNOSIS — Z86.73 HISTORY OF ISCHEMIC RIGHT MCA STROKE: ICD-10-CM

## 2024-11-21 DIAGNOSIS — M54.16 LUMBAR RADICULOPATHY, CHRONIC: ICD-10-CM

## 2024-11-21 DIAGNOSIS — I48.0 PAROXYSMAL ATRIAL FIBRILLATION: Primary | ICD-10-CM

## 2024-11-21 RX ORDER — ERGOCALCIFEROL 1.25 MG/1
50000 CAPSULE, LIQUID FILLED ORAL WEEKLY
Qty: 12 CAPSULE | Refills: 0 | Status: SHIPPED | OUTPATIENT
Start: 2024-11-21

## 2024-11-21 NOTE — LETTER
November 22, 2024     Cecille Calero MD  [address]  [address]  [facility] [address]    Patient: Mague Escamilla   YOB: 1960   Date of Visit: 11/21/2024     Dear Cecille Calero MD:       Thank you for referring Mague Escamilla to me for evaluation. Below are the relevant portions of my assessment and plan of care.    If you have questions, please do not hesitate to call me. I look forward to following Mague along with you.         Sincerely,        Amado Deng MD        CC: No Recipients    Amado Deng MD  11/22/24 1207  Sign when Signing Visit  Date of visit: 11/22/2024   Patient ID: Mague Escamilla  Age: 64 y.o.     Chief compliant:   Chief Complaint   Patient presents with   • Stroke       HPI:      Mague Escamilla is a 64 y.o. right-handed female with ischemic stroke (5/2024) with no residual deficit, chronic back pain with a h/o lumbar fusion, SCS s/p removal, DVT on Xarelto, COPD, former tobacco use, and DANIE who presents for evaluation of stroke. She was referred from her primary care provider for further evaluation.    She was diagnosed with an ischemic stroke at [facility] as confirmed on MRI on 5/29/2024. She reports she couldn't see things well on her left side. She felt drunk while walking. She tried to put coffee ground in her , but missed. She noticed since leaving the hospital she has had some blurriness of vision just out of her right eye. She reports her walking is normal now. She has a history of DVT in the right leg a couple years ago. She wasn't taking Xarelto with food and had forgotten to take it. She was also put on a cholesterol medication on discharge which she is taking. She had a GABRIELA that was reassuring during her hospitalization. She was discharged on a Zio patch which she recalls briefly completing, but did not hear regarding the results. She reports drooping of her face after getting her teeth pulled.     She denies any history of  palpitations, chest pain, exertional shortness of breath.  Denies having a diagnosis of any cardiac problems or arrhythmia.  She reports her daughter  from atrial fibrillation.    Pertinent FHx/Social: former smoker, no significant alcohol use,    Review of Systems   Constitutional:  Negative for activity change, appetite change, fatigue and unexpected weight change.   HENT:  Negative for ear pain, hearing loss, nosebleeds, tinnitus and trouble swallowing.    Eyes:  Negative for photophobia, pain and visual disturbance.   Respiratory:  Negative for choking, chest tightness, shortness of breath and stridor.    Cardiovascular:  Negative for chest pain and palpitations.   Endocrine: Negative for cold intolerance and heat intolerance.   Musculoskeletal:  Positive for gait problem (1 fall in last year).   Allergic/Immunologic: Positive for food allergies (shell fish).   Neurological:  Negative for dizziness, tremors, syncope, weakness, light-headedness, numbness and headaches.   Psychiatric/Behavioral:  Negative for confusion and decreased concentration.         The following portions of the patient's history were reviewed and updated as appropriate: allergies, current medications, past family history, past medical history, past social history, past surgical history and problem list.    Vitals:    24 1524   BP: 130/90   Pulse: 70   SpO2: 94%       Neurological Exam  Mental Status  Awake, alert and oriented to person, place and time. Recent and remote memory are intact. Speech is normal. Language is fluent with no aphasia. Attention and concentration are normal. Fund of knowledge is appropriate for level of education.    Cranial Nerves  CN II: Visual acuity is normal. Visual fields full to confrontation.  CN III, IV, VI: Extraocular movements intact bilaterally. Normal lids and orbits bilaterally. Pupils equal round and reactive to light bilaterally.  CN V: Facial sensation is normal.  CN VII: Full and symmetric  facial movement.  CN IX, X: Palate elevates symmetrically  CN XI: Shoulder shrug strength is normal.  CN XII: Tongue midline without atrophy or fasciculations.    Motor  Normal muscle bulk throughout. Normal muscle tone.                                               Right                     Left   Shoulder adduction               5                          5  Elbow flexion                         5                          5  Elbow extension                    5                          5  Wrist flexion                           5                          5  Wrist extension                      5                          5  Thumb abduction                   5                          5  Hip flexion                              4+                          5  Hip extension                         5                          5  Knee flexion                           5                          5  Knee extension                      4+                          5  Plantarflexion                         5                          5  Dorsiflexion                            4+                          5  Strength: Pain limited RLE.    Sensory  Light touch is normal in upper and lower extremities. Pinprick abnormality: Vibration is normal in upper and lower extremities. Proprioception is normal in upper and lower extremities. Sensation: Complex pinprick deficits. Lateral aspect of left wrist and forearm diminished on left arm. Diminished to lateral aspect right foot and leg with improvement about the knee. .     Reflexes                                            Right                      Left  Brachioradialis                    2+                         2+  Biceps                                 2+                         2+  Triceps                                2+                         2+  Patellar                                2+                         2+  Achilles                                2+                   "       2+  Right Plantar: downgoing  Left Plantar: downgoing    Right pathological reflexes: Ankle clonus absent.  Left pathological reflexes: Ankle clonus absent.    Coordination    Finger-to-nose, rapid alternating movements and heel-to-shin normal bilaterally without dysmetria.    Gait  Casual gait is normal including stance, stride, and arm swing. Romberg is absent.      GABRIELA Normal LV EF 55-60%, RV normal, Mild-moderate MR, Mild  TR, No PFO, No MARCUS clot    She had an event monitor for 11 days: \" IMPRESSION:   1. Abnormal event monitor with the patient being in sinus rhythm with episodes   of paroxysmal atrial fibrillation that are brief and less than 10 seconds in   duration, at heart rates up to 166 per minute.   2. Rare isolated premature ventricular ectopy.   3. No sustained or nonsustained ventricular arrhythmias noted.\"    Imaging: Radiology report reviewed: MRI report reviewed, right parietal and occipital strokes reported.  CTA head and neck wnl  Labs reviewed including LDL 51    Assessment/Plan:    Mague Escamilla is a 64 y.o. female presenting to establish care after an ischemic stroke at [facility] in May 2024.  Imaging unavailable for review, but distribution and symptoms raise concern for embolic appearing process.  Cardiac monitor post discharge is noted for transient episodes of tachycardia consistent with atrial fibrillation.  Patient was on Xarelto for a right lower extremity DVT, but was intermittently compliant due to abdominal issues and was not taking with an evening meal.  Overall, suspect cardioembolic strokes on in setting of insufficient compliance with Xarelto.  She should continue her Xarelto at this time.  Patient was started on Lipitor 40 mg.  This can be dose reduced if she is experiencing side effects as her LDL is at goal and do not suspect an atherosclerotic process.  She appears to be largely resolved from her stroke deficits on examination, and current exam findings are " more in line with chronic back pain and suggestive of possible right L4/L5 radiculopathy.  She is established with orthopedic surgery and pain management with prior lumbar fusion.         Diagnoses and all orders for this visit:    1. Paroxysmal atrial fibrillation (Primary)  -     Ambulatory Referral to Cardiology    2. History of ischemic right MCA stroke  -     Ambulatory Referral to Cardiology    3. Lumbar radiculopathy, chronic           Additional recommendations/considerations:  Will follow-up in 6 months to ensure no recurrent symptoms    Amado Deng MD

## 2024-12-02 NOTE — PROGRESS NOTES
RM:________    Referral Provider: Amado Deng MD Alsoufi, Enaam H, MD    NEW PATIENT/ CONSULT  PREVIOUS CARDIOLOGIST: ______________________________    CARDIAC TESTING: __________________________________________________    : 1960   AGE: 64 y.o.    2024  REASON FOR VISIT/  CC:      WT: ____________ BP: __________L __________R/ HR_______________    ALLERGIES:  Erythromycin and Shellfish-derived products  SMOKING HISTORY  Social History     Tobacco Use    Smoking status: Former     Current packs/day: 0.00     Average packs/day: 2.0 packs/day for 40.0 years (80.0 ttl pk-yrs)     Types: Cigarettes     Start date:      Quit date: 2017     Years since quittin.9    Smokeless tobacco: Never   Vaping Use    Vaping status: Never Used   Substance Use Topics    Alcohol use: No    Drug use: No     Single     H/O: MI_____   STROKE________   GOUT_____   ANEMIA______     CAROTID________ HIV____ CAD_______ HYPERCHOL _____    H/O: CHF _____   RF____ DM___ HTN_______PVD____THYROID DISEASE_______    PMH: GI ____   HEPATITIS ___ KIDNEY DISEASE ___ LUNG DISEASE _______     SLEEP APNEA ____ BLOOD CLOTS ____ DVT ____ VEIN STRIPPING ___________      STOP BANG _________ (CARDIO ONCOLOGY ONLY)    CANCER _________________________________ CHEMO/ RADIATION__________

## 2024-12-03 ENCOUNTER — OFFICE VISIT (OUTPATIENT)
Age: 64
End: 2024-12-03
Payer: MEDICARE

## 2024-12-03 VITALS
DIASTOLIC BLOOD PRESSURE: 60 MMHG | SYSTOLIC BLOOD PRESSURE: 110 MMHG | HEART RATE: 62 BPM | WEIGHT: 192 LBS | BODY MASS INDEX: 35.33 KG/M2 | HEIGHT: 62 IN

## 2024-12-03 DIAGNOSIS — I63.9 CEREBROVASCULAR ACCIDENT (CVA), UNSPECIFIED MECHANISM: Primary | ICD-10-CM

## 2024-12-03 DIAGNOSIS — E78.2 MIXED HYPERLIPIDEMIA: ICD-10-CM

## 2024-12-03 PROBLEM — E66.811 OBESITY (BMI 30.0-34.9): Status: RESOLVED | Noted: 2024-10-21 | Resolved: 2024-12-03

## 2024-12-03 NOTE — PROGRESS NOTES
"      CARDIOLOGY    Frida Byrnes MD    ENCOUNTER DATE:  12/03/2024    Mague Escamilla / 64 y.o. / female        CHIEF COMPLAINT / REASON FOR OFFICE VISIT     Atrial Fibrillation      HISTORY OF PRESENT ILLNESS       HPI    Mague Escamilla is a 64 y.o. female     Unfortunately, the patient cannot give me a very good history and going through Epic I have no significant information. The last discharge summary to review was in 2019, but reportedly she has been hospitalized since then with a stroke, though she cannot tell me what year the stroke occurred. I can see that she had a transesophageal echo done in May or June of 2024 in the [facility] system but I cannot review those results.     She denies chest pain. She has baseline shortness of breath which she attributes to COPD. She denies any palpitations. She is limited on what she can do physically due to back pain.         VITAL SIGNS     Visit Vitals  /60   Pulse 62   Ht 157.5 cm (62\")   Wt 87.1 kg (192 lb)   LMP  (LMP Unknown)   BMI 35.12 kg/m²         Wt Readings from Last 3 Encounters:   12/03/24 87.1 kg (192 lb)   11/21/24 86.8 kg (191 lb 4.8 oz)   11/01/24 84.1 kg (185 lb 6.4 oz)     Body mass index is 35.12 kg/m².      PHYSICAL EXAMINATION     Constitutional:       General: Not in acute distress.  Neck:      Vascular: No carotid bruit or JVD.   Pulmonary:      Effort: Pulmonary effort is normal.      Breath sounds: Normal breath sounds.   Cardiovascular:      Normal rate. Regular rhythm.      Murmurs: There is no murmur.   Psychiatric:         Mood and Affect: Mood and affect normal.           REVIEWED DATA       ECG 12 Lead    Date/Time: 12/3/2024 12:59 PM  Performed by: Frida Byrnes MD    Authorized by: Frida Byrnes MD  Comparison: compared with previous ECG from 11/15/2024  Similar to previous ECG  Rhythm: sinus rhythm  BPM: 62  Conduction: conduction normal  ST Segments: ST segments normal  T Waves: T waves normal    Clinical impression: " "normal ECG            Lipid Panel          10/16/2024    08:44   Lipid Panel   Total Cholesterol 144    Triglycerides 51    HDL Cholesterol 82    VLDL Cholesterol 11    LDL Cholesterol  51        Lab Results   Component Value Date    GLUCOSE 69 10/16/2024    BUN 11 10/16/2024    CREATININE 0.93 10/16/2024     10/16/2024    K 4.3 10/16/2024     10/16/2024    CALCIUM 9.0 10/16/2024    PROTEINTOT 6.7 04/18/2022    ALBUMIN 4.1 10/16/2024    ALT 11 10/16/2024    AST 17 10/16/2024    ALKPHOS 128 (H) 10/16/2024    BILITOT 0.8 10/16/2024    GLOB 2.3 04/18/2022    AGRATIO 1.9 04/18/2022    BCR 11.8 10/16/2024    ANIONGAP 11.0 04/18/2022    EGFR 62.0 04/18/2022       ASSESSMENT & PLAN      Diagnosis Plan   1. Cerebrovascular accident (CVA), unspecified mechanism  ECG 12 Lead      2. Mixed hyperlipidemia  ECG 12 Lead          Unfortunately, I cannot tell much about this lady except that she has had a stroke at some point in the past. She cannot give me a timeframe. I have no record except I can see that she had a transesophageal echo done between May and June of 2024, but I do not have results. She mentioned wearing a monitor. I do not see this in the computer and I do not have any results. Her rhythm is sinus. Her blood pressure is normal. I really have nothing to add at this point as she is not complaining of anything cardiac, and at least based on exam and EKG I do not see anything concerning, and I have no records to review. My office is going to try to obtain records.       Transesophageal echocardiogram from [facility] May 29, 2024 ejection fraction normal 55 to 60%.  Mildly dilated left atrium.  No left atrial appendage thrombus.  Bubble study was normal.  Mild right atrial enlargement.  Mild mitral regurgitation.    Addendum 12/4/2024:I received a message from Dr. Amado Deng that she had a monitor that was \"suggestive of atrial fibrillation\".  Reportedly she was on Xarelto for DVTs at the " time but noncompliant.  I will have her come in for a Zio patch to look for any arrhythmia.      Orders Placed This Encounter   Procedures    ECG 12 Lead     This order was created via procedure documentation     Order Specific Question:   Release to patient     Answer:   Routine Release [[id number]]           MEDICATIONS         Discharge Medications            Accurate as of December 3, 2024  2:45 PM. If you have any questions, ask your nurse or doctor.                Continue These Medications        Instructions Start Date   albuterol sulfate  (90 Base) MCG/ACT inhaler  Commonly known as: PROVENTIL HFA;VENTOLIN HFA;PROAIR HFA   2 puffs, Inhalation, Every 4 Hours PRN      amoxicillin-clavulanate 875-125 MG per tablet  Commonly known as: AUGMENTIN   1 tablet, Oral, 2 Times Daily      atorvastatin 40 MG tablet  Commonly known as: LIPITOR   1 tablet, Daily      cyclobenzaprine 10 MG tablet  Commonly known as: FLEXERIL   10 mg, 3 Times Daily PRN      Diclofenac Sodium 1 % gel gel  Commonly known as: VOLTAREN   4 g, 4 Times Daily PRN      ipratropium 0.03 % nasal spray  Commonly known as: ATROVENT   2 sprays, Nasal, Every 12 Hours      Lyrica 200 MG capsule  Generic drug: pregabalin   200 mg, 2 Times Daily      methadone 10 MG tablet  Commonly known as: DOLOPHINE   10 mg, 3 Times Daily      mupirocin 2 % nasal ointment  Commonly known as: BACTROBAN   Administer  into the nostril(s) as directed by provider. As directed pre op      rivaroxaban 20 MG tablet  Commonly known as: XARELTO   20 mg, Every Evening      SYMBICORT IN   1 puff, 2 Times Daily      vitamin D 1.25 MG (64342 UT) capsule capsule  Commonly known as: ERGOCALCIFEROL   50,000 Units, Oral, Weekly                 Frida Byrnes MD  12/03/24  14:45 EST    Part of this note may be an electronic transcription/translation of spoken language to printed text using the Dragon dictation system.

## 2024-12-12 DIAGNOSIS — I63.9 CEREBROVASCULAR ACCIDENT (CVA), UNSPECIFIED MECHANISM: Primary | ICD-10-CM

## 2024-12-12 DIAGNOSIS — E78.2 MIXED HYPERLIPIDEMIA: ICD-10-CM

## 2024-12-12 DIAGNOSIS — I48.91 ATRIAL FIBRILLATION, UNSPECIFIED TYPE: ICD-10-CM

## 2025-01-08 ENCOUNTER — TELEPHONE (OUTPATIENT)
Dept: CARDIOLOGY | Facility: CLINIC | Age: 65
End: 2025-01-08

## 2025-01-08 NOTE — TELEPHONE ENCOUNTER
Please let her know that I got the results back on her monitor and I did not see any abnormal rhythms such as atrial fibrillation.  She had some short bursts of fast heart rhythm but nothing that was sustained.  She is reportedly on Xarelto for DVTs so she should continue it for that reason.  No atrial fibrillation noted.

## 2025-01-08 NOTE — TELEPHONE ENCOUNTER
Results and recommendations called to pt.  Instructed to call with any further questions or concerns.  Verbalized understanding.    Tonja Harper RN  Triage Nurse, [facility]  01/08/25 15:00 EST

## 2025-02-05 NOTE — TELEPHONE ENCOUNTER
Mague Escamilla confirmed she made this request.    Rx Refill Note  Requested Prescriptions     Pending Prescriptions Disp Refills    mupirocin (BACTROBAN) 2 % nasal ointment       Sig: Administer  into the nostril(s) as directed by provider. As directed pre op      Last office visit with prescribing clinician: 10/21/2024   Last telemedicine visit with prescribing clinician: Visit date not found   Next office visit with prescribing clinician: 4/21/2025

## 2025-02-07 NOTE — TELEPHONE ENCOUNTER
Mague Palmeras stated that she used this ointment on any wound that she gets because she does not heal well. She is on blood thinners and this ointment helps her heal.   4 days ago she hit her right leg on the  door and has an open wound. She stated that it is not infected, but she wants to use this ointment to prevent infection.   Please advise

## 2025-02-07 NOTE — TELEPHONE ENCOUNTER
Caller: Mague Escamilla    Relationship: Self    Best call back number: [phone]      What was the call regarding: PATIENT STATED THAT SHE ASKED HER PHARMACY TO REQUEST A REFILL FROM DR. MILTON BUT THEY MISTAKENLY FAXED HER PREVIOUS DOCTOR. PATIENT STATED SHE PICKED UP HER PRESCRIPTION AND DID NOT REALIZE UNTIL AFTER THAT IT HAS HER PREVIOUS DOCTOR'S NAME ON IT. PATIENT STATED SHE HAS ALREADY GOTTEN THE MEDICATION NOW.

## 2025-03-13 NOTE — TELEPHONE ENCOUNTER
Mague Escamilla stopped by the office to request the following refill. Patient is using [facility]. Patient has 1 pill left.    Rx Refill Note  Requested Prescriptions     Pending Prescriptions Disp Refills    rivaroxaban (XARELTO) 20 MG tablet 30 tablet      Sig: Take 1 tablet by mouth Every Evening. PT IS TO SEE MANUEL GAGE ON  04/20 AND IS ASKING WHEN TO HOLD FOR SURGERY      Last office visit with prescribing clinician: 10/21/2024   Last telemedicine visit with prescribing clinician: Visit date not found   Next office visit with prescribing clinician: 4/21/2025   Please advise this refill

## 2025-03-14 ENCOUNTER — TELEPHONE (OUTPATIENT)
Dept: NEUROLOGY | Facility: CLINIC | Age: 65
End: 2025-03-14
Payer: MEDICARE

## 2025-03-14 NOTE — TELEPHONE ENCOUNTER
Attempted to LVM in regard to provider being out of office. Informed on VM, [employer] message, and mail reminder on the next new appointment set for patient. Patient is scheduled for May 22nd at 12:30PM

## 2025-03-19 ENCOUNTER — OFFICE VISIT (OUTPATIENT)
Dept: FAMILY MEDICINE CLINIC | Facility: CLINIC | Age: 65
End: 2025-03-19
Payer: MEDICARE

## 2025-03-19 VITALS
SYSTOLIC BLOOD PRESSURE: 110 MMHG | OXYGEN SATURATION: 93 % | WEIGHT: 198.4 LBS | BODY MASS INDEX: 36.51 KG/M2 | HEART RATE: 84 BPM | DIASTOLIC BLOOD PRESSURE: 56 MMHG | HEIGHT: 62 IN | TEMPERATURE: 98.2 F | RESPIRATION RATE: 18 BRPM

## 2025-03-19 DIAGNOSIS — J44.1 COPD WITH EXACERBATION: Primary | ICD-10-CM

## 2025-03-19 PROBLEM — F17.200 CURRENT SMOKER: Status: RESOLVED | Noted: 2017-03-15 | Resolved: 2025-03-19

## 2025-03-19 RX ORDER — DOXYCYCLINE 100 MG/1
100 CAPSULE ORAL 2 TIMES DAILY
Qty: 14 CAPSULE | Refills: 0 | Status: SHIPPED | OUTPATIENT
Start: 2025-03-19 | End: 2025-03-26

## 2025-03-19 RX ORDER — ERGOCALCIFEROL 1.25 MG/1
50000 CAPSULE, LIQUID FILLED ORAL WEEKLY
Qty: 12 CAPSULE | Refills: 0 | Status: SHIPPED | OUTPATIENT
Start: 2025-03-19

## 2025-03-19 RX ORDER — PREDNISONE 20 MG/1
40 TABLET ORAL DAILY
Qty: 10 TABLET | Refills: 0 | Status: SHIPPED | OUTPATIENT
Start: 2025-03-19 | End: 2025-03-24
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
## 2025-05-06 ENCOUNTER — TELEPHONE (OUTPATIENT)
Dept: FAMILY MEDICINE CLINIC | Facility: CLINIC | Age: 65
End: 2025-05-06

## 2025-05-06 ENCOUNTER — OFFICE VISIT (OUTPATIENT)
Dept: FAMILY MEDICINE CLINIC | Facility: CLINIC | Age: 65
End: 2025-05-06
Payer: MEDICARE

## 2025-05-06 VITALS
BODY MASS INDEX: 36.7 KG/M2 | WEIGHT: 199.4 LBS | HEART RATE: 76 BPM | SYSTOLIC BLOOD PRESSURE: 118 MMHG | HEIGHT: 62 IN | TEMPERATURE: 98 F | OXYGEN SATURATION: 92 % | DIASTOLIC BLOOD PRESSURE: 60 MMHG | RESPIRATION RATE: 18 BRPM

## 2025-05-06 DIAGNOSIS — Z12.2 ENCOUNTER FOR SCREENING FOR LUNG CANCER: ICD-10-CM

## 2025-05-06 DIAGNOSIS — I63.40 CEREBROVASCULAR ACCIDENT (CVA) DUE TO EMBOLISM OF CEREBRAL ARTERY: ICD-10-CM

## 2025-05-06 DIAGNOSIS — Z12.31 ENCOUNTER FOR SCREENING MAMMOGRAM FOR MALIGNANT NEOPLASM OF BREAST: ICD-10-CM

## 2025-05-06 DIAGNOSIS — J44.9 CHRONIC OBSTRUCTIVE PULMONARY DISEASE, UNSPECIFIED COPD TYPE: ICD-10-CM

## 2025-05-06 DIAGNOSIS — Z87.891 HX OF SMOKING: ICD-10-CM

## 2025-05-06 DIAGNOSIS — Z12.11 ENCOUNTER FOR SCREENING FOR MALIGNANT NEOPLASM OF COLON: ICD-10-CM

## 2025-05-06 DIAGNOSIS — E55.9 VITAMIN D DEFICIENCY: Primary | ICD-10-CM

## 2025-05-06 NOTE — TELEPHONE ENCOUNTER
Caller: Mague Escamilla    Relationship to patient: Self    Best call back number:[phone]      Patient is needing: SHE ACCIDENTALLY LEFT HER REFERRAL PAPERS THERE.  SHE WILL BE BY IN THE MORNING TO PICK THEM UP.

## 2025-05-06 NOTE — TELEPHONE ENCOUNTER
HUB TO RELAY  LEFT PT VM IN REGARDS TO THEIR LAB ORDERS THEY LEFT HERE AT THE OFFICE. PLEASE ADVISE THAT THEY ARE HERE WHENEVER SHE HAS TIME TO PICK THEM UP. THANKS
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
## 2025-05-19 ENCOUNTER — RESULTS FOLLOW-UP (OUTPATIENT)
Dept: FAMILY MEDICINE CLINIC | Facility: CLINIC | Age: 65
End: 2025-05-19
Payer: MEDICARE

## 2025-05-19 DIAGNOSIS — Z12.31 ENCOUNTER FOR SCREENING MAMMOGRAM FOR MALIGNANT NEOPLASM OF BREAST: ICD-10-CM

## 2025-05-21 ENCOUNTER — TELEPHONE (OUTPATIENT)
Dept: FAMILY MEDICINE CLINIC | Facility: CLINIC | Age: 65
End: 2025-05-21
Payer: MEDICARE

## 2025-05-21 NOTE — TELEPHONE ENCOUNTER
Spoke with patient regarding overdue labs. She stated that she will call next week to schedule a lab appointment.

## 2025-06-12 ENCOUNTER — TELEPHONE (OUTPATIENT)
Dept: FAMILY MEDICINE CLINIC | Facility: CLINIC | Age: 65
End: 2025-06-12
Payer: MEDICARE

## 2025-06-12 NOTE — TELEPHONE ENCOUNTER
Spoke with patient regarding overdue labs and CT chest. She scheduled a lab appointment. She stated that she will call [facility] to schedule that CT Chest.

## 2025-06-21 LAB
25(OH)D3+25(OH)D2 SERPL-MCNC: 32.5 NG/ML (ref 30–100)
CHOLEST SERPL-MCNC: 152 MG/DL (ref 0–200)
HDLC SERPL-MCNC: 88 MG/DL (ref 40–60)
LDLC SERPL CALC-MCNC: 54 MG/DL (ref 0–100)
TRIGL SERPL-MCNC: 45 MG/DL (ref 0–150)
VLDLC SERPL CALC-MCNC: 10 MG/DL (ref 5–40)

## 2025-06-27 DIAGNOSIS — Z12.2 ENCOUNTER FOR SCREENING FOR LUNG CANCER: ICD-10-CM

## 2025-06-27 DIAGNOSIS — Z87.891 HX OF SMOKING: ICD-10-CM

## 2025-08-13 ENCOUNTER — OFFICE VISIT (OUTPATIENT)
Dept: FAMILY MEDICINE CLINIC | Facility: CLINIC | Age: 65
End: 2025-08-13
Payer: MEDICARE

## 2025-08-13 VITALS
HEIGHT: 62 IN | OXYGEN SATURATION: 93 % | SYSTOLIC BLOOD PRESSURE: 124 MMHG | WEIGHT: 201.6 LBS | BODY MASS INDEX: 37.1 KG/M2 | DIASTOLIC BLOOD PRESSURE: 62 MMHG | HEART RATE: 77 BPM

## 2025-08-13 DIAGNOSIS — J44.9 CHRONIC OBSTRUCTIVE PULMONARY DISEASE, UNSPECIFIED COPD TYPE: ICD-10-CM

## 2025-08-13 DIAGNOSIS — Z78.0 POSTMENOPAUSAL: ICD-10-CM

## 2025-08-13 DIAGNOSIS — E55.9 VITAMIN D DEFICIENCY: Primary | ICD-10-CM

## 2025-08-13 DIAGNOSIS — H60.312 ACUTE DIFFUSE OTITIS EXTERNA OF LEFT EAR: ICD-10-CM

## 2025-08-13 DIAGNOSIS — E78.5 HYPERLIPIDEMIA, UNSPECIFIED HYPERLIPIDEMIA TYPE: ICD-10-CM

## 2025-08-13 PROCEDURE — 99214 OFFICE O/P EST MOD 30 MIN: CPT | Performed by: NURSE PRACTITIONER

## 2025-08-13 PROCEDURE — 1125F AMNT PAIN NOTED PAIN PRSNT: CPT | Performed by: NURSE PRACTITIONER

## 2025-08-13 RX ORDER — NEOMYCIN SULFATE, POLYMYXIN B SULFATE, HYDROCORTISONE 3.5; 10000; 1 MG/ML; [USP'U]/ML; MG/ML
4 SOLUTION/ DROPS AURICULAR (OTIC) 4 TIMES DAILY
Qty: 10 ML | Refills: 0 | Status: SHIPPED | OUTPATIENT
Start: 2025-08-13

## 2025-08-13 RX ORDER — ALBUTEROL SULFATE 90 UG/1
2 INHALANT RESPIRATORY (INHALATION) EVERY 4 HOURS PRN
Qty: 17 G | Refills: 2 | Status: SHIPPED | OUTPATIENT
Start: 2025-08-13

## 2025-08-13 RX ORDER — ERGOCALCIFEROL 1.25 MG/1
50000 CAPSULE, LIQUID FILLED ORAL WEEKLY
Qty: 12 CAPSULE | Refills: 0 | Status: SHIPPED | OUTPATIENT
Start: 2025-08-13